# Patient Record
Sex: MALE | Race: OTHER | URBAN - METROPOLITAN AREA
[De-identification: names, ages, dates, MRNs, and addresses within clinical notes are randomized per-mention and may not be internally consistent; named-entity substitution may affect disease eponyms.]

---

## 2021-02-10 ENCOUNTER — OFFICE VISIT (OUTPATIENT)
Dept: URBAN - METROPOLITAN AREA CLINIC 115 | Facility: CLINIC | Age: 66
End: 2021-02-10
Payer: MEDICARE

## 2021-02-10 ENCOUNTER — WEB ENCOUNTER (OUTPATIENT)
Dept: URBAN - METROPOLITAN AREA CLINIC 115 | Facility: CLINIC | Age: 66
End: 2021-02-10

## 2021-02-10 ENCOUNTER — LAB OUTSIDE AN ENCOUNTER (OUTPATIENT)
Dept: URBAN - METROPOLITAN AREA CLINIC 115 | Facility: CLINIC | Age: 66
End: 2021-02-10

## 2021-02-10 VITALS
HEART RATE: 102 BPM | HEIGHT: 66 IN | WEIGHT: 183 LBS | TEMPERATURE: 97.8 F | SYSTOLIC BLOOD PRESSURE: 133 MMHG | DIASTOLIC BLOOD PRESSURE: 74 MMHG | BODY MASS INDEX: 29.41 KG/M2

## 2021-02-10 DIAGNOSIS — E11.9 DIABETES: ICD-10-CM

## 2021-02-10 DIAGNOSIS — R16.0 HEPATOMEGALY: ICD-10-CM

## 2021-02-10 DIAGNOSIS — R74.8 ABNORMAL LIVER ENZYMES: ICD-10-CM

## 2021-02-10 DIAGNOSIS — Z86.010 PERSONAL HISTORY OF COLON POLYPS: ICD-10-CM

## 2021-02-10 PROCEDURE — 99214 OFFICE O/P EST MOD 30 MIN: CPT | Performed by: INTERNAL MEDICINE

## 2021-02-10 RX ORDER — GLIPIZIDE 10 MG/1
TABLET ORAL
Qty: 0 | Refills: 0 | Status: ACTIVE | COMMUNITY
Start: 1900-01-01

## 2021-02-10 RX ORDER — METOPROLOL TARTRATE 25 MG/1
TABLET, FILM COATED ORAL
Qty: 0 | Refills: 0 | COMMUNITY
Start: 1900-01-01

## 2021-02-10 RX ORDER — LOSARTAN POTASSIUM 25 MG/1
TABLET, FILM COATED ORAL
Qty: 0 | Refills: 0 | COMMUNITY
Start: 1900-01-01

## 2021-02-10 RX ORDER — CLOTRIMAZOLE 10 MG/G
1 APPLICATION CREAM TOPICAL TWICE A DAY
Status: ACTIVE | COMMUNITY

## 2021-02-10 RX ORDER — GLUCOSAMINE HCL/CHONDROITIN SU 500-400 MG
CAPSULE ORAL
Qty: 0 | Refills: 0 | Status: ACTIVE | COMMUNITY
Start: 1900-01-01

## 2021-02-10 RX ORDER — FAMOTIDINE 10 MG
1 TABLET AS NEEDED TABLET ORAL TWICE A DAY
Status: ACTIVE | COMMUNITY

## 2021-02-10 RX ORDER — METFORMIN HYDROCHLORIDE 1000 MG/1
TAKE 1 TABLET (1,000 MG) BY ORAL ROUTE 2 TIMES PER DAY WITH MORNING AND EVENING MEALS TABLET, COATED ORAL 2
Qty: 0 | Refills: 0 | COMMUNITY
Start: 1900-01-01

## 2021-02-10 RX ORDER — ASPIRIN 81 MG/1
TABLET, COATED ORAL
Qty: 0 | Refills: 0 | COMMUNITY
Start: 1900-01-01

## 2021-02-10 RX ORDER — ATORVASTATIN CALCIUM 40 MG/1
TABLET, FILM COATED ORAL
Qty: 0 | Refills: 0 | Status: ACTIVE | COMMUNITY
Start: 1900-01-01

## 2021-02-10 RX ORDER — CLOBETASOL PROPIONATE 0.5 MG/G
1 APPLICATION CREAM TOPICAL TWICE A DAY
Status: ACTIVE | COMMUNITY

## 2021-02-10 NOTE — HPI-TODAY'S VISIT:
[facility] PATIENT WIFE IS HERE. REFERED FOR ELEVATED LIPASE AND H/O PANCREATITIS, BACK PAIN ANDL ETOH USE  BEFOE USE TO 2 PEG A DAY  NO STOMACH PAIN. , BEFORE CHEST PAIN. NOT EATING MUCH. NO WT LOSS.   LIPASE 142. /AST 57. HEPATITIS B/C NEG. CBC NORMAL, MCV NORMAL   NO ABDOMINAL PAIN, NO WT LOSS.   BM REGULAR, NO BLEEDING P/R

## 2021-02-25 ENCOUNTER — TELEPHONE ENCOUNTER (OUTPATIENT)
Dept: URBAN - METROPOLITAN AREA SURGERY CENTER 30 | Facility: SURGERY CENTER | Age: 66
End: 2021-02-25

## 2021-05-13 ENCOUNTER — LAB OUTSIDE AN ENCOUNTER (OUTPATIENT)
Dept: URBAN - METROPOLITAN AREA CLINIC 82 | Facility: CLINIC | Age: 66
End: 2021-05-13

## 2021-05-13 ENCOUNTER — OFFICE VISIT (OUTPATIENT)
Dept: URBAN - METROPOLITAN AREA TELEHEALTH 2 | Facility: TELEHEALTH | Age: 66
End: 2021-05-13

## 2021-05-13 ENCOUNTER — OFFICE VISIT (OUTPATIENT)
Dept: URBAN - METROPOLITAN AREA CLINIC 82 | Facility: CLINIC | Age: 66
End: 2021-05-13
Payer: MEDICARE

## 2021-05-13 VITALS
TEMPERATURE: 97.4 F | DIASTOLIC BLOOD PRESSURE: 82 MMHG | WEIGHT: 172.6 LBS | HEIGHT: 66 IN | BODY MASS INDEX: 27.74 KG/M2 | HEART RATE: 73 BPM | SYSTOLIC BLOOD PRESSURE: 134 MMHG

## 2021-05-13 VITALS — HEIGHT: 66 IN | BODY MASS INDEX: 29.41 KG/M2 | WEIGHT: 183 LBS

## 2021-05-13 DIAGNOSIS — R16.0 HEPATOMEGALY: ICD-10-CM

## 2021-05-13 DIAGNOSIS — F10.10 ALCOHOL ABUSE: ICD-10-CM

## 2021-05-13 DIAGNOSIS — E11.9 DIABETES: ICD-10-CM

## 2021-05-13 DIAGNOSIS — I25.9 CAD (CORONARY ARTERY DISEASE): ICD-10-CM

## 2021-05-13 DIAGNOSIS — R74.8 ABNORMAL LIVER ENZYMES: ICD-10-CM

## 2021-05-13 DIAGNOSIS — R63.4 WEIGHT LOSS: ICD-10-CM

## 2021-05-13 DIAGNOSIS — K70.30 ALCOHOLIC CIRRHOSIS OF LIVER WITHOUT ASCITES: ICD-10-CM

## 2021-05-13 DIAGNOSIS — R10.13 EPIGASTRIC PAIN: ICD-10-CM

## 2021-05-13 DIAGNOSIS — K85.20 ALCOHOL-INDUCED ACUTE PANCREATITIS WITHOUT INFECTION OR NECROSIS: ICD-10-CM

## 2021-05-13 DIAGNOSIS — Z86.010 PERSONAL HISTORY OF COLON POLYPS: ICD-10-CM

## 2021-05-13 PROCEDURE — 99214 OFFICE O/P EST MOD 30 MIN: CPT | Performed by: INTERNAL MEDICINE

## 2021-05-13 RX ORDER — ATORVASTATIN CALCIUM 40 MG/1
TABLET, FILM COATED ORAL
Qty: 0 | Refills: 0 | COMMUNITY
Start: 1900-01-01

## 2021-05-13 RX ORDER — LOSARTAN POTASSIUM 25 MG/1
TABLET, FILM COATED ORAL
Qty: 0 | Refills: 0 | COMMUNITY
Start: 1900-01-01

## 2021-05-13 RX ORDER — CLOTRIMAZOLE 10 MG/G
1 APPLICATION CREAM TOPICAL TWICE A DAY
COMMUNITY

## 2021-05-13 RX ORDER — METOPROLOL TARTRATE 25 MG/1
TABLET, FILM COATED ORAL
Qty: 0 | Refills: 0 | COMMUNITY
Start: 1900-01-01

## 2021-05-13 RX ORDER — GLUCOSAMINE HCL/CHONDROITIN SU 500-400 MG
CAPSULE ORAL
Qty: 0 | Refills: 0 | Status: DISCONTINUED | COMMUNITY
Start: 1900-01-01

## 2021-05-13 RX ORDER — CLOBETASOL PROPIONATE 0.5 MG/G
1 APPLICATION CREAM TOPICAL TWICE A DAY
COMMUNITY

## 2021-05-13 RX ORDER — GLIPIZIDE 10 MG/1
TABLET ORAL
Qty: 0 | Refills: 0 | Status: ACTIVE | COMMUNITY
Start: 1900-01-01

## 2021-05-13 RX ORDER — NICOTINE 14MG/24HR
1 PATCH TO SKIN PATCH, TRANSDERMAL 24 HOURS TRANSDERMAL ONCE A DAY
Status: ACTIVE | COMMUNITY

## 2021-05-13 RX ORDER — CHLORHEXIDINE GLUCONATE 4 %
1 TABLET AS NEEDED LIQUID (ML) TOPICAL ONCE A DAY
Status: ACTIVE | COMMUNITY

## 2021-05-13 RX ORDER — CLOBETASOL PROPIONATE 0.5 MG/G
1 APPLICATION CREAM TOPICAL TWICE A DAY
Status: DISCONTINUED | COMMUNITY

## 2021-05-13 RX ORDER — ASPIRIN 81 MG/1
TABLET, COATED ORAL
Qty: 0 | Refills: 0 | Status: ACTIVE | COMMUNITY
Start: 1900-01-01

## 2021-05-13 RX ORDER — ASPIRIN 81 MG/1
TABLET, COATED ORAL
Qty: 0 | Refills: 0 | COMMUNITY
Start: 1900-01-01

## 2021-05-13 RX ORDER — NYSTATIN 100000 [USP'U]/G
1 APPLICATION CREAM TOPICAL TWICE A DAY
Status: ACTIVE | COMMUNITY

## 2021-05-13 RX ORDER — CLOTRIMAZOLE 10 MG/G
1 APPLICATION CREAM TOPICAL TWICE A DAY
Status: ACTIVE | COMMUNITY

## 2021-05-13 RX ORDER — GLUCOSAMINE HCL/CHONDROITIN SU 500-400 MG
CAPSULE ORAL
Qty: 0 | Refills: 0 | COMMUNITY
Start: 1900-01-01

## 2021-05-13 RX ORDER — GLIPIZIDE 10 MG/1
TABLET ORAL
Qty: 0 | Refills: 0 | COMMUNITY
Start: 1900-01-01

## 2021-05-13 RX ORDER — LORATADINE 10 MG
1 TABLET TABLET ORAL ONCE A DAY
Status: ACTIVE | COMMUNITY

## 2021-05-13 RX ORDER — ATORVASTATIN CALCIUM 40 MG/1
TABLET, FILM COATED ORAL
Qty: 0 | Refills: 0 | Status: ACTIVE | COMMUNITY
Start: 1900-01-01

## 2021-05-13 RX ORDER — FAMOTIDINE 10 MG
1 TABLET AS NEEDED TABLET ORAL TWICE A DAY
COMMUNITY

## 2021-05-13 RX ORDER — METOPROLOL TARTRATE 25 MG/1
TABLET, FILM COATED ORAL
Qty: 0 | Refills: 0 | Status: ACTIVE | COMMUNITY
Start: 1900-01-01

## 2021-05-13 RX ORDER — METFORMIN HYDROCHLORIDE 1000 MG/1
TAKE 1 TABLET (1,000 MG) BY ORAL ROUTE 2 TIMES PER DAY WITH MORNING AND EVENING MEALS TABLET, COATED ORAL 2
Qty: 0 | Refills: 0 | COMMUNITY
Start: 1900-01-01

## 2021-05-13 RX ORDER — FAMOTIDINE 10 MG
1 TABLET AS NEEDED TABLET ORAL TWICE A DAY
Status: DISCONTINUED | COMMUNITY

## 2021-05-13 RX ORDER — LOSARTAN POTASSIUM 25 MG/1
TABLET, FILM COATED ORAL
Qty: 0 | Refills: 0 | Status: ACTIVE | COMMUNITY
Start: 1900-01-01

## 2021-05-13 RX ORDER — METFORMIN HYDROCHLORIDE 1000 MG/1
TAKE 1 TABLET (1,000 MG) BY ORAL ROUTE 2 TIMES PER DAY WITH MORNING AND EVENING MEALS TABLET, COATED ORAL 2
Qty: 0 | Refills: 0 | Status: ACTIVE | COMMUNITY
Start: 1900-01-01

## 2021-05-13 RX ORDER — OMEGA-3 FATTY ACIDS/FISH OIL 300-1000MG
1 CAPSULE CAPSULE ORAL ONCE A DAY
Status: ACTIVE | COMMUNITY

## 2021-05-13 NOTE — HPI-TODAY'S VISIT:
BETSY WIFE, IS HERE.   LAST TIME CT SCAN ADVISED, TRIED APPOINTMENT, DID NOT WORK OUT.   FEB ADMITTED FOR CHEST PAIN, HEART NROMAL  ABDOMINAL PAIN, RECENTLY FOUND PANCREATITIS, AND BLOOD SUGAR HIGH  NO PAIN, EAT NORMAL,   ETOH STOPPED, 2MONTHS, .

## 2021-05-13 NOTE — PHYSICAL EXAM PSYCH:
normal mood with appropriate affect [Initial Evaluation] : an initial evaluation of [Chest Pain] : chest pain

## 2021-05-13 NOTE — HPI-OTHER HISTORIES
Records from the PCP shows thrombocytosis in the past hypertriglyceridemia lymphocytosis in the past major depression.  Tobacco use insomnia.  Headache disorder polyneuropathy from diabetes history of stroke and long-term insulin use and vitamin deficiency alcohol abuse reflux disease.  And B12 deficiency.  Patient was in [facility] admit date was 4/24/2021 patient was admitted for acute pancreatitis without necrosis and prerenal azotemia cerebrovascular accident diabetic ketoacidosis and had also subdural hematoma.  On 4/27/2021 it showed a deep fluid diffuse low-attenuation throughout the liver compatible with fatty liver and subtle nodular contour of the liver and edema surrounding the pancreatic tail overall mild improving compared to prior CTs so impression was acute mild pancreatitis with interval decrease in inflammatory change fatty liver with subtle cirrhosis and renal calculi and he had a heart murmur and a CVA with right-sided weakness and SDH status post fall and alcohol abuse

## 2021-05-14 LAB
A/G RATIO: 1.8
ALBUMIN: 4.8
ALKALINE PHOSPHATASE: 103
AST (SGOT): 38
BASO (ABSOLUTE): 0.1
BASOS: 1
BILIRUBIN, TOTAL: 0.4
BUN/CREATININE RATIO: 12
BUN: 8
CALCIUM: 10.1
CARBON DIOXIDE, TOTAL: 18
CHLORIDE: 103
COMMENT:: (no result)
CREATININE: 0.67
EGFR IF AFRICN AM: 117
EGFR IF NONAFRICN AM: 101
EOS (ABSOLUTE): 0.5
EOS: 5
GLOBULIN, TOTAL: 2.6
GLUCOSE: 206
HBSAG SCREEN: NEGATIVE
HCV AB: <0.1
HEMATOCRIT: 39.9
HEMATOLOGY COMMENTS:: (no result)
HEMOGLOBIN: 13.8
HEP B SURFACE AB, QUAL: NON REACTIVE
IMMATURE CELLS: (no result)
IMMATURE GRANS (ABS): 0
IMMATURE GRANULOCYTES: 0
INR: 1.1
LIPASE: 430
LYMPHS (ABSOLUTE): 2.6
LYMPHS: 27
MCH: 31.9
MCHC: 34.6
MCV: 92
MONOCYTES(ABSOLUTE): 0.6
MONOCYTES: 6
NEUTROPHILS (ABSOLUTE): 6
NEUTROPHILS: 61
NRBC: (no result)
PLATELETS: 335
POTASSIUM: 4.7
PROTEIN, TOTAL: 7.4
PROTHROMBIN TIME: 11.4
RBC: 4.32
RDW: 11.9
RPR: NON REACTIVE
SODIUM: 138
WBC: 9.8

## 2021-06-11 ENCOUNTER — LAB OUTSIDE AN ENCOUNTER (OUTPATIENT)
Dept: URBAN - METROPOLITAN AREA CLINIC 82 | Facility: CLINIC | Age: 66
End: 2021-06-11

## 2021-06-11 ENCOUNTER — TELEPHONE ENCOUNTER (OUTPATIENT)
Dept: URBAN - METROPOLITAN AREA CLINIC 6 | Facility: CLINIC | Age: 66
End: 2021-06-11

## 2021-06-11 LAB
CREATININE POC: 0.6
PERFORMING LAB: (no result)

## 2021-06-16 ENCOUNTER — OFFICE VISIT (OUTPATIENT)
Dept: URBAN - METROPOLITAN AREA CLINIC 115 | Facility: CLINIC | Age: 66
End: 2021-06-16

## 2021-07-14 ENCOUNTER — OFFICE VISIT (OUTPATIENT)
Dept: URBAN - METROPOLITAN AREA CLINIC 115 | Facility: CLINIC | Age: 66
End: 2021-07-14

## 2021-07-14 ENCOUNTER — WEB ENCOUNTER (OUTPATIENT)
Dept: URBAN - METROPOLITAN AREA CLINIC 115 | Facility: CLINIC | Age: 66
End: 2021-07-14

## 2021-07-14 ENCOUNTER — OFFICE VISIT (OUTPATIENT)
Dept: URBAN - METROPOLITAN AREA CLINIC 115 | Facility: CLINIC | Age: 66
End: 2021-07-14
Payer: MEDICARE

## 2021-07-14 ENCOUNTER — LAB OUTSIDE AN ENCOUNTER (OUTPATIENT)
Dept: URBAN - METROPOLITAN AREA CLINIC 115 | Facility: CLINIC | Age: 66
End: 2021-07-14

## 2021-07-14 VITALS
BODY MASS INDEX: 28.42 KG/M2 | TEMPERATURE: 97.2 F | WEIGHT: 176.8 LBS | DIASTOLIC BLOOD PRESSURE: 73 MMHG | SYSTOLIC BLOOD PRESSURE: 142 MMHG | HEIGHT: 66 IN | HEART RATE: 95 BPM

## 2021-07-14 DIAGNOSIS — F10.10 ALCOHOL ABUSE: ICD-10-CM

## 2021-07-14 DIAGNOSIS — K70.30 ALCOHOLIC CIRRHOSIS OF LIVER WITHOUT ASCITES: ICD-10-CM

## 2021-07-14 DIAGNOSIS — Z86.010 PERSONAL HISTORY OF COLON POLYPS: ICD-10-CM

## 2021-07-14 DIAGNOSIS — E11.9 DIABETES: ICD-10-CM

## 2021-07-14 DIAGNOSIS — R16.0 HEPATOMEGALY: ICD-10-CM

## 2021-07-14 DIAGNOSIS — R74.8 ELEVATED LIPASE: ICD-10-CM

## 2021-07-14 DIAGNOSIS — K85.20 ALCOHOL-INDUCED ACUTE PANCREATITIS WITHOUT INFECTION OR NECROSIS: ICD-10-CM

## 2021-07-14 DIAGNOSIS — R63.4 WEIGHT LOSS: ICD-10-CM

## 2021-07-14 DIAGNOSIS — R10.13 EPIGASTRIC PAIN: ICD-10-CM

## 2021-07-14 DIAGNOSIS — I25.9 CAD (CORONARY ARTERY DISEASE): ICD-10-CM

## 2021-07-14 PROBLEM — 235942001: Status: ACTIVE | Noted: 2021-05-13

## 2021-07-14 PROBLEM — 15167005: Status: ACTIVE | Noted: 2021-05-13

## 2021-07-14 PROBLEM — 79922009: Status: ACTIVE | Noted: 2021-05-13

## 2021-07-14 PROBLEM — 80515008 HEPATOMEGALY: Status: ACTIVE | Noted: 2021-05-13

## 2021-07-14 PROBLEM — 89362005: Status: ACTIVE | Noted: 2021-05-13

## 2021-07-14 PROBLEM — 53741008 CORONARY ARTERY DISEASE: Status: ACTIVE | Noted: 2021-02-10

## 2021-07-14 PROBLEM — 420054005: Status: ACTIVE | Noted: 2021-05-13

## 2021-07-14 PROBLEM — 111552007 DIABETES MELLITUS WITHOUT COMPLICATION: Status: ACTIVE | Noted: 2021-02-10

## 2021-07-14 PROCEDURE — 99214 OFFICE O/P EST MOD 30 MIN: CPT | Performed by: INTERNAL MEDICINE

## 2021-07-14 RX ORDER — GLIPIZIDE 10 MG/1
TABLET ORAL
Qty: 0 | Refills: 0 | Status: ACTIVE | COMMUNITY
Start: 1900-01-01

## 2021-07-14 RX ORDER — LORATADINE 10 MG
1 TABLET TABLET ORAL ONCE A DAY
Status: ACTIVE | COMMUNITY

## 2021-07-14 RX ORDER — CLOTRIMAZOLE 10 MG/G
1 APPLICATION CREAM TOPICAL TWICE A DAY
Status: ACTIVE | COMMUNITY

## 2021-07-14 RX ORDER — METOPROLOL TARTRATE 25 MG/1
TABLET, FILM COATED ORAL
Qty: 0 | Refills: 0 | Status: ACTIVE | COMMUNITY
Start: 1900-01-01

## 2021-07-14 RX ORDER — ATORVASTATIN CALCIUM 40 MG/1
TABLET, FILM COATED ORAL
Qty: 0 | Refills: 0 | Status: ACTIVE | COMMUNITY
Start: 1900-01-01

## 2021-07-14 RX ORDER — OMEGA-3 FATTY ACIDS/FISH OIL 300-1000MG
1 CAPSULE CAPSULE ORAL ONCE A DAY
Status: ACTIVE | COMMUNITY

## 2021-07-14 RX ORDER — CHLORHEXIDINE GLUCONATE 4 %
1 TABLET AS NEEDED LIQUID (ML) TOPICAL ONCE A DAY
Status: DISCONTINUED | COMMUNITY

## 2021-07-14 RX ORDER — ASPIRIN 81 MG/1
TABLET, COATED ORAL
Qty: 0 | Refills: 0 | Status: ACTIVE | COMMUNITY
Start: 1900-01-01

## 2021-07-14 RX ORDER — LOSARTAN POTASSIUM 25 MG/1
TABLET, FILM COATED ORAL
Qty: 0 | Refills: 0 | Status: ACTIVE | COMMUNITY
Start: 1900-01-01

## 2021-07-14 RX ORDER — METFORMIN HYDROCHLORIDE 1000 MG/1
TAKE 1 TABLET (1,000 MG) BY ORAL ROUTE 2 TIMES PER DAY WITH MORNING AND EVENING MEALS TABLET, COATED ORAL 2
Qty: 0 | Refills: 0 | Status: ACTIVE | COMMUNITY
Start: 1900-01-01

## 2021-07-14 RX ORDER — NYSTATIN 100000 [USP'U]/G
1 APPLICATION CREAM TOPICAL TWICE A DAY
Status: ACTIVE | COMMUNITY

## 2021-07-14 RX ORDER — CHLORHEXIDINE GLUCONATE 4 %
1 TABLET AS NEEDED LIQUID (ML) TOPICAL ONCE A DAY
Status: ACTIVE | COMMUNITY

## 2021-07-14 RX ORDER — NICOTINE 14MG/24HR
1 PATCH TO SKIN PATCH, TRANSDERMAL 24 HOURS TRANSDERMAL ONCE A DAY
Status: ACTIVE | COMMUNITY

## 2021-07-14 RX ORDER — NICOTINE 14MG/24HR
1 PATCH TO SKIN PATCH, TRANSDERMAL 24 HOURS TRANSDERMAL ONCE A DAY
Status: DISCONTINUED | COMMUNITY

## 2021-07-23 PROBLEM — 428283002 HISTORY OF POLYP OF COLON: Status: ACTIVE | Noted: 2021-07-23

## 2021-08-04 PROBLEM — 428283002 HISTORY OF POLYP OF COLON: Status: ACTIVE | Noted: 2021-02-10

## 2021-08-05 ENCOUNTER — OFFICE VISIT (OUTPATIENT)
Dept: URBAN - METROPOLITAN AREA SURGERY CENTER 13 | Facility: SURGERY CENTER | Age: 66
End: 2021-08-05
Payer: MEDICARE

## 2021-08-05 DIAGNOSIS — Z86.010 H/O ADENOMATOUS POLYP OF COLON: ICD-10-CM

## 2021-08-05 DIAGNOSIS — D12.5 ADENOMA OF SIGMOID COLON: ICD-10-CM

## 2021-08-05 PROCEDURE — G8907 PT DOC NO EVENTS ON DISCHARG: HCPCS | Performed by: INTERNAL MEDICINE

## 2021-08-05 PROCEDURE — 45385 COLONOSCOPY W/LESION REMOVAL: CPT | Performed by: INTERNAL MEDICINE

## 2021-08-06 ENCOUNTER — OFFICE VISIT (OUTPATIENT)
Dept: URBAN - METROPOLITAN AREA SURGERY CENTER 13 | Facility: SURGERY CENTER | Age: 66
End: 2021-08-06

## 2021-08-25 ENCOUNTER — OFFICE VISIT (OUTPATIENT)
Dept: URBAN - METROPOLITAN AREA CLINIC 115 | Facility: CLINIC | Age: 66
End: 2021-08-25

## 2021-09-02 ENCOUNTER — TELEPHONE ENCOUNTER (OUTPATIENT)
Dept: URBAN - METROPOLITAN AREA CLINIC 23 | Facility: CLINIC | Age: 66
End: 2021-09-02

## 2021-10-25 ENCOUNTER — OFFICE VISIT (OUTPATIENT)
Dept: URBAN - METROPOLITAN AREA CLINIC 115 | Facility: CLINIC | Age: 66
End: 2021-10-25

## 2023-11-06 ENCOUNTER — LAB OUTSIDE AN ENCOUNTER (OUTPATIENT)
Dept: URBAN - METROPOLITAN AREA CLINIC 115 | Facility: CLINIC | Age: 68
End: 2023-11-06

## 2023-11-06 ENCOUNTER — DASHBOARD ENCOUNTERS (OUTPATIENT)
Age: 68
End: 2023-11-06

## 2023-11-06 ENCOUNTER — OFFICE VISIT (OUTPATIENT)
Dept: URBAN - METROPOLITAN AREA CLINIC 115 | Facility: CLINIC | Age: 68
End: 2023-11-06
Payer: MEDICARE

## 2023-11-06 VITALS
TEMPERATURE: 97.4 F | SYSTOLIC BLOOD PRESSURE: 110 MMHG | BODY MASS INDEX: 26.52 KG/M2 | HEIGHT: 66 IN | HEART RATE: 78 BPM | DIASTOLIC BLOOD PRESSURE: 62 MMHG | WEIGHT: 165 LBS

## 2023-11-06 DIAGNOSIS — R74.8 ABNORMAL LIVER ENZYMES: ICD-10-CM

## 2023-11-06 DIAGNOSIS — K85.20 ALCOHOL-INDUCED ACUTE PANCREATITIS WITHOUT INFECTION OR NECROSIS: ICD-10-CM

## 2023-11-06 DIAGNOSIS — K70.30 ALCOHOLIC CIRRHOSIS OF LIVER WITHOUT ASCITES: ICD-10-CM

## 2023-11-06 DIAGNOSIS — R16.0 HEPATOMEGALY: ICD-10-CM

## 2023-11-06 PROCEDURE — 99214 OFFICE O/P EST MOD 30 MIN: CPT | Performed by: INTERNAL MEDICINE

## 2023-11-06 RX ORDER — NITROGLYCERIN 0.4 MG/1
AS DIRECTED TABLET SUBLINGUAL
Status: ACTIVE | COMMUNITY

## 2023-11-06 RX ORDER — GLIPIZIDE 10 MG/1
TABLET ORAL
Qty: 0 | Refills: 0 | Status: ACTIVE | COMMUNITY
Start: 1900-01-01

## 2023-11-06 RX ORDER — SITAGLIPTIN 100 MG/1
1 TABLET TABLET, FILM COATED ORAL ONCE A DAY
Status: ACTIVE | COMMUNITY

## 2023-11-06 RX ORDER — LORATADINE 10 MG
1 TABLET TABLET ORAL ONCE A DAY
Status: ACTIVE | COMMUNITY

## 2023-11-06 RX ORDER — METOPROLOL TARTRATE 25 MG/1
TABLET, FILM COATED ORAL
Qty: 0 | Refills: 0 | Status: ACTIVE | COMMUNITY
Start: 1900-01-01

## 2023-11-06 RX ORDER — ATORVASTATIN CALCIUM 40 MG/1
TABLET, FILM COATED ORAL
Qty: 0 | Refills: 0 | Status: ACTIVE | COMMUNITY
Start: 1900-01-01

## 2023-11-06 RX ORDER — GABAPENTIN 300 MG/1
1 CAPSULE CAPSULE ORAL ONCE A DAY
Status: ACTIVE | COMMUNITY

## 2023-11-06 RX ORDER — METFORMIN HYDROCHLORIDE 1000 MG/1
TAKE 1 TABLET (1,000 MG) BY ORAL ROUTE 2 TIMES PER DAY WITH MORNING AND EVENING MEALS TABLET, COATED ORAL 2
Qty: 0 | Refills: 0 | Status: ACTIVE | COMMUNITY
Start: 1900-01-01

## 2023-11-06 RX ORDER — OMEGA-3 FATTY ACIDS/FISH OIL 300-1000MG
1 CAPSULE CAPSULE ORAL ONCE A DAY
Status: ACTIVE | COMMUNITY

## 2023-11-06 RX ORDER — LOSARTAN POTASSIUM 25 MG/1
TABLET, FILM COATED ORAL
Qty: 0 | Refills: 0 | Status: ACTIVE | COMMUNITY
Start: 1900-01-01

## 2023-11-06 RX ORDER — CLOTRIMAZOLE 10 MG/G
1 APPLICATION CREAM TOPICAL TWICE A DAY
Status: ACTIVE | COMMUNITY

## 2023-11-06 RX ORDER — NYSTATIN 100000 [USP'U]/G
1 APPLICATION CREAM TOPICAL TWICE A DAY
Status: ACTIVE | COMMUNITY

## 2023-11-06 RX ORDER — ASPIRIN 81 MG/1
TABLET, COATED ORAL
Qty: 0 | Refills: 0 | Status: ACTIVE | COMMUNITY
Start: 1900-01-01

## 2023-11-06 RX ORDER — DICLOFENAC SODIUM TOPICAL GEL, 1% 10 MG/G
AS DIRECTED GEL TOPICAL
Status: ACTIVE | COMMUNITY

## 2023-11-06 RX ORDER — BENZONATATE 100 MG/1
1 CAPSULE AS NEEDED CAPSULE ORAL THREE TIMES A DAY
Status: ACTIVE | COMMUNITY

## 2023-11-06 RX ORDER — DICLOFENAC SODIUM 20 MG/G
2 PUMPS SOLUTION TOPICAL TWICE A DAY
Status: ACTIVE | COMMUNITY

## 2023-11-06 NOTE — HPI-TODAY'S VISIT:
Patient was in the hospital at [facility].  WBC count was 18,000 on 10/20/2023 hemoglobin 17.1 MCV was 90 platelet count was 400.  Sodium was 131 CO2 19.  Albumin 5.3 bilirubin and AST ALT were normal.  Albumin was 5.3 lipase was more than 6000.  INR was normal.  CT chest was done showing edema and inflammatory changes surrounding the tail of the pancreas extended to the left paracolic gutter and left pararenal space.  This was on 10/20/2023.  Patient was diagnosed with idiopathic acute pancreatitis.  And a dehydrated chest pain and acute coronary syndrome.  Patient had high blood sugar also.  Of 600.  The pain was moderate to severe.  Patient uses alcohol occasionally.  [facility]. Barnett agree with the consultation.  Patient's friend is Dr. Cj Wellington at the transplant hepatologist patient was admitted for hyperglycemia but the normal LFTs and lipase was more than 6000.  There was no gallstone triglyceride was normal.  Patient's WBC count came down and the hemoglobin also came down.  There was any edema and inflammatory changes surrounding the tail of pancreas with inflammatory changes extending to the left paracolic gutter and left renal status.  Reported history of alcohol use abuse before.  Patient's glucose was normalized and lipase came down to 2061.  CT pulmonary arteriogram was negative for pulmonary embolism.  Patient was recently started on Januvia unclear whether it was related to the autoimmunity serology was ordered patient is on aspirin Lovenox with Plavix on hold patient was discharged on aspirin and Plavix and pantoprazole.  Al 2023intestinal   WAS HIGH SUGAR, AND ABDOMINAL PAIN AND CHEST PAIN   NOW FEELING BETTER  EAT GOOD, APPETITE NOT GOOD  NO ABDOMINAL PAIN,   ITCHING IN BACK.   WT LOSS 164 NOW, 172 BEFORE  BM REGULAR, NO BLEEDING P/R  ETOH, 1-2 TIMES BEFORE THIS ATTACK. , WAS ON DAILY OR QOD  AS FAMILY CAME FROM [place]  JANUVIA, WAS STARTED, METFORMIN. GLYPIZIDE, FARXIGA.   ??JANUVIA   FEEL DEPRESSON, LOSS OF INTEREST,

## 2023-12-18 ENCOUNTER — OFFICE VISIT (OUTPATIENT)
Dept: URBAN - METROPOLITAN AREA CLINIC 115 | Facility: CLINIC | Age: 68
End: 2023-12-18

## 2024-01-17 ENCOUNTER — OFFICE VISIT (OUTPATIENT)
Dept: URBAN - METROPOLITAN AREA CLINIC 115 | Facility: CLINIC | Age: 69
End: 2024-01-17

## 2024-08-26 ENCOUNTER — TELEPHONE ENCOUNTER (OUTPATIENT)
Dept: URBAN - METROPOLITAN AREA CLINIC 92 | Facility: CLINIC | Age: 69
End: 2024-08-26

## 2024-08-27 ENCOUNTER — LAB OUTSIDE AN ENCOUNTER (OUTPATIENT)
Dept: URBAN - METROPOLITAN AREA CLINIC 92 | Facility: CLINIC | Age: 69
End: 2024-08-27

## 2024-09-04 ENCOUNTER — OFFICE VISIT (OUTPATIENT)
Dept: URBAN - METROPOLITAN AREA CLINIC 82 | Facility: CLINIC | Age: 69
End: 2024-09-04
Payer: MEDICARE

## 2024-09-04 VITALS
HEIGHT: 66 IN | SYSTOLIC BLOOD PRESSURE: 117 MMHG | HEART RATE: 71 BPM | DIASTOLIC BLOOD PRESSURE: 72 MMHG | WEIGHT: 166.8 LBS | TEMPERATURE: 98 F | BODY MASS INDEX: 26.81 KG/M2

## 2024-09-04 DIAGNOSIS — R74.8 ELEVATED LIPASE: ICD-10-CM

## 2024-09-04 DIAGNOSIS — K85.20 ALCOHOL-INDUCED ACUTE PANCREATITIS WITHOUT INFECTION OR NECROSIS: ICD-10-CM

## 2024-09-04 DIAGNOSIS — K70.30 ALCOHOLIC CIRRHOSIS OF LIVER WITHOUT ASCITES: ICD-10-CM

## 2024-09-04 DIAGNOSIS — R16.0 HEPATOMEGALY: ICD-10-CM

## 2024-09-04 PROCEDURE — 99213 OFFICE O/P EST LOW 20 MIN: CPT | Performed by: INTERNAL MEDICINE

## 2024-09-04 RX ORDER — BENZONATATE 100 MG/1
1 CAPSULE AS NEEDED CAPSULE ORAL THREE TIMES A DAY
Status: ACTIVE | COMMUNITY

## 2024-09-04 RX ORDER — SITAGLIPTIN 100 MG/1
1 TABLET TABLET, FILM COATED ORAL ONCE A DAY
Status: ACTIVE | COMMUNITY

## 2024-09-04 RX ORDER — METOPROLOL TARTRATE 25 MG/1
TABLET, FILM COATED ORAL
Qty: 0 | Refills: 0 | Status: ACTIVE | COMMUNITY
Start: 1900-01-01

## 2024-09-04 RX ORDER — DICLOFENAC SODIUM 20 MG/G
2 PUMPS SOLUTION TOPICAL TWICE A DAY
Status: ACTIVE | COMMUNITY

## 2024-09-04 RX ORDER — GLIPIZIDE 10 MG/1
TABLET ORAL
Qty: 0 | Refills: 0 | Status: ACTIVE | COMMUNITY
Start: 1900-01-01

## 2024-09-04 RX ORDER — OMEGA-3 FATTY ACIDS/FISH OIL 300-1000MG
1 CAPSULE CAPSULE ORAL ONCE A DAY
Status: ACTIVE | COMMUNITY

## 2024-09-04 RX ORDER — ASPIRIN 81 MG/1
TABLET, COATED ORAL
Qty: 0 | Refills: 0 | Status: ACTIVE | COMMUNITY
Start: 1900-01-01

## 2024-09-04 RX ORDER — NYSTATIN 100000 U/G
1 APPLICATION CREAM TOPICAL TWICE A DAY
Status: ACTIVE | COMMUNITY

## 2024-09-04 RX ORDER — CLOTRIMAZOLE 10 MG/G
1 APPLICATION CREAM TOPICAL TWICE A DAY
Status: ACTIVE | COMMUNITY

## 2024-09-04 RX ORDER — NITROGLYCERIN 0.4 MG/1
AS DIRECTED TABLET SUBLINGUAL
Status: ACTIVE | COMMUNITY

## 2024-09-04 RX ORDER — LORATADINE 10 MG
1 TABLET TABLET ORAL ONCE A DAY
Status: ACTIVE | COMMUNITY

## 2024-09-04 RX ORDER — DICLOFENAC SODIUM TOPICAL GEL, 1% 10 MG/G
AS DIRECTED GEL TOPICAL
Status: ACTIVE | COMMUNITY

## 2024-09-04 RX ORDER — ATORVASTATIN CALCIUM 40 MG/1
TABLET, FILM COATED ORAL
Qty: 0 | Refills: 0 | Status: ACTIVE | COMMUNITY
Start: 1900-01-01

## 2024-09-04 RX ORDER — METFORMIN HYDROCHLORIDE 1000 MG/1
TAKE 1 TABLET (1,000 MG) BY ORAL ROUTE 2 TIMES PER DAY WITH MORNING AND EVENING MEALS TABLET, COATED ORAL 2
Qty: 0 | Refills: 0 | Status: ACTIVE | COMMUNITY
Start: 1900-01-01

## 2024-09-04 RX ORDER — GABAPENTIN 300 MG/1
1 CAPSULE CAPSULE ORAL ONCE A DAY
Status: ACTIVE | COMMUNITY

## 2024-09-04 RX ORDER — LOSARTAN POTASSIUM 25 MG/1
TABLET, FILM COATED ORAL
Qty: 0 | Refills: 0 | Status: ACTIVE | COMMUNITY
Start: 1900-01-01

## 2024-09-04 NOTE — HPI-TODAY'S VISIT:
EVERYTHING GOING GOOD  NO STOMACH ISSUE  MRI , COULD NOT DO 3 TIMES  WENT TO [facility]  DOES CHEW TOBACOO  NO ETOH COMPLETLY  NO BLEEDING OR PAIN.  25 TH HE HAD FALL, H/O BRAIN HEMORRHOGE

## 2025-04-07 ENCOUNTER — OFFICE VISIT (OUTPATIENT)
Dept: URBAN - METROPOLITAN AREA CLINIC 115 | Facility: CLINIC | Age: 70
End: 2025-04-07

## 2025-04-07 RX ORDER — SITAGLIPTIN 100 MG/1
1 TABLET TABLET, FILM COATED ORAL ONCE A DAY
Status: ACTIVE | COMMUNITY

## 2025-04-07 RX ORDER — METFORMIN HYDROCHLORIDE 1000 MG/1
TAKE 1 TABLET (1,000 MG) BY ORAL ROUTE 2 TIMES PER DAY WITH MORNING AND EVENING MEALS TABLET, COATED ORAL 2
Qty: 0 | Refills: 0 | Status: ACTIVE | COMMUNITY
Start: 1900-01-01

## 2025-04-07 RX ORDER — LOSARTAN POTASSIUM 25 MG/1
TABLET, FILM COATED ORAL
Qty: 0 | Refills: 0 | Status: ACTIVE | COMMUNITY
Start: 1900-01-01

## 2025-04-07 RX ORDER — ICOSAPENT ETHYL 1000 MG/1
CAPSULE ORAL
Qty: 180 CAPSULE | Status: ACTIVE | COMMUNITY

## 2025-04-07 RX ORDER — BENZONATATE 100 MG/1
1 CAPSULE AS NEEDED CAPSULE ORAL THREE TIMES A DAY
Status: ACTIVE | COMMUNITY

## 2025-04-07 RX ORDER — FERROUS SULFATE TAB 325 MG (65 MG ELEMENTAL FE) 325 (65 FE) MG
TAKE 1 TABLET BY MOUTH EVERY DAY AS DIRECTED FOR 180 DAYS TAB ORAL
Qty: 180 EACH | Refills: 0 | Status: ACTIVE | COMMUNITY

## 2025-04-07 RX ORDER — NITROGLYCERIN 0.4 MG/1
AS DIRECTED TABLET SUBLINGUAL
Status: ACTIVE | COMMUNITY

## 2025-04-07 RX ORDER — OMEGA-3 FATTY ACIDS/FISH OIL 300-1000MG
1 CAPSULE CAPSULE ORAL ONCE A DAY
Status: ACTIVE | COMMUNITY

## 2025-04-07 RX ORDER — LEVOTHYROXINE SODIUM 75 UG/1
TABLET ORAL
Qty: 90 TABLET | Status: ACTIVE | COMMUNITY

## 2025-04-07 RX ORDER — CLOTRIMAZOLE 1% ANTIFUNGAL CREAM 1 G/100G
1 APPLICATION CREAM TOPICAL TWICE A DAY
Status: ACTIVE | COMMUNITY

## 2025-04-07 RX ORDER — GLIPIZIDE 10 MG/1
TABLET ORAL
Qty: 0 | Refills: 0 | Status: ACTIVE | COMMUNITY
Start: 1900-01-01

## 2025-04-07 RX ORDER — NYSTATIN 100000 U/G
1 APPLICATION CREAM TOPICAL TWICE A DAY
Status: ACTIVE | COMMUNITY

## 2025-04-07 RX ORDER — ESCITALOPRAM 10 MG/1
TABLET, FILM COATED ORAL
Qty: 90 TABLET | Status: ACTIVE | COMMUNITY

## 2025-04-07 RX ORDER — METOPROLOL TARTRATE 25 MG/1
TABLET, FILM COATED ORAL
Qty: 0 | Refills: 0 | Status: ACTIVE | COMMUNITY
Start: 1900-01-01

## 2025-04-07 RX ORDER — DAPAGLIFLOZIN 10 MG/1
TABLET, FILM COATED ORAL
Qty: 90 TABLET | Status: ACTIVE | COMMUNITY

## 2025-04-07 RX ORDER — LORATADINE 10 MG
1 TABLET TABLET ORAL ONCE A DAY
Status: ACTIVE | COMMUNITY

## 2025-04-07 RX ORDER — GABAPENTIN 300 MG/1
1 CAPSULE CAPSULE ORAL ONCE A DAY
Status: ACTIVE | COMMUNITY

## 2025-04-07 RX ORDER — ASPIRIN 81 MG/1
TABLET, COATED ORAL
Qty: 0 | Refills: 0 | Status: ACTIVE | COMMUNITY
Start: 1900-01-01

## 2025-04-07 RX ORDER — ATORVASTATIN CALCIUM 40 MG/1
TABLET, FILM COATED ORAL
Qty: 0 | Refills: 0 | Status: ACTIVE | COMMUNITY
Start: 1900-01-01

## 2025-04-07 RX ORDER — DICLOFENAC SODIUM 20 MG/G
2 PUMPS SOLUTION TOPICAL TWICE A DAY
Status: ACTIVE | COMMUNITY

## 2025-04-07 RX ORDER — DICLOFENAC SODIUM TOPICAL GEL, 1% 10 MG/G
AS DIRECTED GEL TOPICAL
Status: ACTIVE | COMMUNITY

## 2025-05-23 ENCOUNTER — CLAIMS CREATED FROM THE CLAIM WINDOW (OUTPATIENT)
Dept: URBAN - METROPOLITAN AREA MEDICAL CENTER 31 | Facility: MEDICAL CENTER | Age: 70
End: 2025-05-23

## 2025-05-23 PROCEDURE — 99254 IP/OBS CNSLTJ NEW/EST MOD 60: CPT | Performed by: INTERNAL MEDICINE

## 2025-06-18 ENCOUNTER — OFFICE VISIT (OUTPATIENT)
Dept: URBAN - METROPOLITAN AREA CLINIC 115 | Facility: CLINIC | Age: 70
End: 2025-06-18

## 2025-06-25 ENCOUNTER — OFFICE VISIT (OUTPATIENT)
Dept: URBAN - METROPOLITAN AREA CLINIC 115 | Facility: CLINIC | Age: 70
End: 2025-06-25